# Patient Record
Sex: FEMALE | Race: WHITE
[De-identification: names, ages, dates, MRNs, and addresses within clinical notes are randomized per-mention and may not be internally consistent; named-entity substitution may affect disease eponyms.]

---

## 2017-10-25 ENCOUNTER — HOSPITAL ENCOUNTER (OUTPATIENT)
Dept: HOSPITAL 17 - PHED | Age: 62
Setting detail: OBSERVATION
LOS: 1 days | Discharge: HOME | End: 2017-10-26
Attending: HOSPITALIST | Admitting: HOSPITALIST
Payer: COMMERCIAL

## 2017-10-25 VITALS — DIASTOLIC BLOOD PRESSURE: 76 MMHG | SYSTOLIC BLOOD PRESSURE: 145 MMHG

## 2017-10-25 VITALS — HEIGHT: 63 IN | BODY MASS INDEX: 32.42 KG/M2 | WEIGHT: 182.98 LBS

## 2017-10-25 VITALS
OXYGEN SATURATION: 95 % | SYSTOLIC BLOOD PRESSURE: 179 MMHG | HEART RATE: 85 BPM | TEMPERATURE: 97.6 F | RESPIRATION RATE: 20 BRPM | DIASTOLIC BLOOD PRESSURE: 86 MMHG

## 2017-10-25 VITALS — DIASTOLIC BLOOD PRESSURE: 80 MMHG | SYSTOLIC BLOOD PRESSURE: 141 MMHG

## 2017-10-25 DIAGNOSIS — M79.7: ICD-10-CM

## 2017-10-25 DIAGNOSIS — I10: ICD-10-CM

## 2017-10-25 DIAGNOSIS — F32.9: ICD-10-CM

## 2017-10-25 DIAGNOSIS — R07.89: Primary | ICD-10-CM

## 2017-10-25 DIAGNOSIS — R06.02: ICD-10-CM

## 2017-10-25 DIAGNOSIS — A60.00: ICD-10-CM

## 2017-10-25 DIAGNOSIS — G43.909: ICD-10-CM

## 2017-10-25 DIAGNOSIS — K21.9: ICD-10-CM

## 2017-10-25 DIAGNOSIS — Z79.899: ICD-10-CM

## 2017-10-25 DIAGNOSIS — Z82.49: ICD-10-CM

## 2017-10-25 DIAGNOSIS — F41.9: ICD-10-CM

## 2017-10-25 LAB
ALBUMIN SERPL-MCNC: 3.8 GM/DL (ref 3.4–5)
ALP SERPL-CCNC: 91 U/L (ref 45–117)
ALT SERPL-CCNC: 30 U/L (ref 10–53)
AST SERPL-CCNC: 15 U/L (ref 15–37)
BASOPHILS # BLD AUTO: 0.1 TH/MM3 (ref 0–0.2)
BASOPHILS NFR BLD: 0.7 % (ref 0–2)
BILIRUB SERPL-MCNC: 0.3 MG/DL (ref 0.2–1)
BUN SERPL-MCNC: 20 MG/DL (ref 7–18)
CALCIUM SERPL-MCNC: 8.4 MG/DL (ref 8.5–10.1)
CHLORIDE SERPL-SCNC: 109 MEQ/L (ref 98–107)
CREAT SERPL-MCNC: 1.2 MG/DL (ref 0.5–1)
EOSINOPHIL # BLD: 0.1 TH/MM3 (ref 0–0.4)
EOSINOPHIL NFR BLD: 1.6 % (ref 0–4)
ERYTHROCYTE [DISTWIDTH] IN BLOOD BY AUTOMATED COUNT: 12.8 % (ref 11.6–17.2)
GFR SERPLBLD BASED ON 1.73 SQ M-ARVRAT: 46 ML/MIN (ref 89–?)
GLUCOSE SERPL-MCNC: 106 MG/DL (ref 74–106)
HCO3 BLD-SCNC: 25 MEQ/L (ref 21–32)
HCT VFR BLD CALC: 43 % (ref 35–46)
HGB BLD-MCNC: 14.1 GM/DL (ref 11.6–15.3)
LYMPHOCYTES # BLD AUTO: 2 TH/MM3 (ref 1–4.8)
LYMPHOCYTES NFR BLD AUTO: 26.8 % (ref 9–44)
MCH RBC QN AUTO: 30.4 PG (ref 27–34)
MCHC RBC AUTO-ENTMCNC: 32.7 % (ref 32–36)
MCV RBC AUTO: 93 FL (ref 80–100)
MONOCYTE #: 0.5 TH/MM3 (ref 0–0.9)
MONOCYTES NFR BLD: 6.6 % (ref 0–8)
NEUTROPHILS # BLD AUTO: 4.7 TH/MM3 (ref 1.8–7.7)
NEUTROPHILS NFR BLD AUTO: 64.3 % (ref 16–70)
PLATELET # BLD: 185 TH/MM3 (ref 150–450)
PMV BLD AUTO: 9.2 FL (ref 7–11)
PROT SERPL-MCNC: 7.4 GM/DL (ref 6.4–8.2)
RBC # BLD AUTO: 4.62 MIL/MM3 (ref 4–5.3)
SODIUM SERPL-SCNC: 141 MEQ/L (ref 136–145)
TROPONIN I SERPL-MCNC: (no result) NG/ML (ref 0.02–0.05)
WBC # BLD AUTO: 7.4 TH/MM3 (ref 4–11)

## 2017-10-25 PROCEDURE — G0378 HOSPITAL OBSERVATION PER HR: HCPCS

## 2017-10-25 PROCEDURE — 93005 ELECTROCARDIOGRAM TRACING: CPT

## 2017-10-25 PROCEDURE — A9502 TC99M TETROFOSMIN: HCPCS

## 2017-10-25 PROCEDURE — 85025 COMPLETE CBC W/AUTO DIFF WBC: CPT

## 2017-10-25 PROCEDURE — 80053 COMPREHEN METABOLIC PANEL: CPT

## 2017-10-25 PROCEDURE — 93017 CV STRESS TEST TRACING ONLY: CPT

## 2017-10-25 PROCEDURE — 96374 THER/PROPH/DIAG INJ IV PUSH: CPT

## 2017-10-25 PROCEDURE — 78452 HT MUSCLE IMAGE SPECT MULT: CPT

## 2017-10-25 PROCEDURE — 99285 EMERGENCY DEPT VISIT HI MDM: CPT

## 2017-10-25 PROCEDURE — 71010: CPT

## 2017-10-25 PROCEDURE — 84484 ASSAY OF TROPONIN QUANT: CPT

## 2017-10-25 NOTE — RADRPT
EXAM DATE/TIME:  10/25/2017 22:53 

 

[facility] COMPARISON:     

No previous studies available for comparison.

 

                     

INDICATIONS :     

Chest pain.

                     

 

MEDICAL HISTORY :     

None.          

 

SURGICAL HISTORY :     

None.   

 

ENCOUNTER:     

Initial                                        

 

ACUITY:     

1 day      

 

PAIN SCORE:     

6/10

 

LOCATION:      

middle chest

 

FINDINGS:     

A single view of the chest demonstrates the lungs to be symmetrically aerated without evidence of mas
s, infiltrate or effusion.  The cardiomediastinal contours are unremarkable.  Osseous structures are 
intact.

 

CONCLUSION:     No acute disease.  

 

 

 

 Frank Serrano MD on October 25, 2017 at 23:05           

Board Certified Radiologist.

 This report was verified electronically.

## 2017-10-25 NOTE — PD
HPI


Chief Complaint:  Chest Pain


Time Seen by Provider:  22:39


Travel History


International Travel<30 days:  No


Contact w/Intl Traveler<30days:  No


Traveled to known affect area:  No





History of Present Illness


HPI


This is a 62-year-old female who presents to the emergency department with 

chest pain that started about 2 hours prior to arrival, feeling like a pressure 

in the center of her chest, moderate severity associated with some tingling in 

her left arm and some discomfort in her jaw.  She also says she was short of 

breath when the chest pain was worse.  She had just taken a long walk prior to 

the onset of chest pain.  She denies any fevers or chills.  She denies any 

recent long trips or leg swelling.  She has a history of hypertension but 

denies diabetes, hyperlipidemia or smoking history.  Her brother had a heart 

attack in her father has congestive heart failure.  She thinks she may have had 

a stress test but it was years ago.





PFSH


Past Medical History


Hx Anticoagulant Therapy:  No


Anxiety:  Yes


Depression:  Yes


Cancer:  No


Cardiovascular Problems:  Yes (htn)


Chemotherapy:  No


Cerebrovascular Accident:  No


Diabetes:  No


Diminished Hearing:  No


Endocrine:  No


Fibromyalgia:  Yes


Gastrointestinal Disorders:  Yes (ACID REFLUX, DIVERTICULITIS, INFLAMED COLON)


GERD:  Yes


Genitourinary:  Yes (KIDNEY INFECTION, "STAGE 3 KIDNEY PROBLEM")


Hepatitis:  Yes (HEP C FROM BLOOD TRANSFUSION-AT 12)


Hiatal Hernia:  No


Hypertension:  Yes


Immune Disorder:  No


Musculoskeletal:  Yes (BACK PAIN)


Neurologic:  Yes (MIGRAINES, [id number])


Psychiatric:  No


Reproductive:  No


Respiratory:  No


Migraines:  Yes


Thyroid Disease:  No


Tetanus Vaccination:  > 5 Years


Pregnant?:  Not Pregnant


:  4


Para:  2


Miscarriage:  2


Tubal Ligation:  Yes





Past Surgical History


Abdominal Surgery:  Yes (appy)


AICD:  No


Appendectomy:  Yes


Gynecologic Surgery:  Yes (total hysterectomy)


Hysterectomy:  Yes (AGE 33)


Joint Replacement:  No


Pacemaker:  No


Other Surgery:  Yes





Social History


Alcohol Use:  Yes (social)


Tobacco Use:  No


Substance Use:  No





Allergies-Medications


(Allergen,Severity, Reaction):  


Coded Allergies:  


     penicillin G (Unverified  Allergy, Intermediate, HIVES, 8/15/17)


Reported Meds & Prescriptions





Reported Meds & Active Scripts


Active


Reported


Vitamin D3 (Cholecalciferol) 2,000 Unit Cap 2,000 Units PO DAILY


Imitrex (Sumatriptan Succinate) 100 Mg Tab 100 Mg PO ONCE PRN


     If a satisfactory response has not been obtained at 2 hours, a second


     dose may be administered


Acyclovir 400 Mg Tab 400 Mg PO BID


Topiramate 50 Mg Tab 50 Mg PO BID


Omeprazole 40 Mg Cap 40 Mg PO DAILY


Lisinopril 10 Mg Tab 10 Mg PO DAILY








Review of Systems


Except as stated in HPI:  all other systems reviewed are Neg





Physical Exam


Narrative


GENERAL:Well appearing, no acute distress


SKIN: Focused skin assessment warm and dry.


HEAD: Atraumatic. Normocephalic. 


EYES: Pupils equal and round.  No injection or drainage. 


ENT:  Moist mucous membranes


NECK: Trachea midline. 


CARDIOVASCULAR: Regular rate and rhythm.  No murmur appreciated.


RESPIRATORY: Clear to auscultation. Breath sounds equal bilaterally. 


GASTROINTESTINAL: Abdomen soft, non-tender, nondistended. 


MUSCULOSKELETAL: No obvious deformities. 


NEUROLOGICAL: Awake and alert. No obvious cranial nerve deficits.  Moving all 

extremities.


PSYCHIATRIC: Appropriate mood and affect; insight and judgment normal.





Data


Data


Last Documented VS





Vital Signs








  Date Time  Temp Pulse Resp B/P (MAP) Pulse Ox O2 Delivery O2 Flow Rate FiO2


 


10/25/17 22:40      Room Air  


 


10/25/17 22:20 97.6 85 20 179/86 (117) 95   








Orders





 Orders


Electrocardiogram (10/25/17 22:44)


Complete Blood Count With Diff (10/25/17 22:44)


Comprehensive Metabolic Panel (10/25/17 22:44)


Troponin I (10/25/17 22:44)


Chest, Single Ap (10/25/17 22:44)


Ecg Monitoring (10/25/17 22:44)


Bilateral Bp Monitoring (10/25/17 22:44)


Iv Access Insert/Monitor (10/25/17 22:44)


Oximetry (10/25/17 22:44)


Oxygen Administration (10/25/17 22:44)


Aspirin Chew (Aspirin Chew) (10/25/17 22:45)


Sodium Chloride 0.9% Flush (Ns Flush) (10/25/17 22:45)


Nitroglycerin 2% Oint (Nitroglycerin 2% (10/25/17 22:45)





Labs





Laboratory Tests








Test


  10/25/17


22:50


 


White Blood Count 7.4 TH/MM3 


 


Red Blood Count 4.62 MIL/MM3 


 


Hemoglobin 14.1 GM/DL 


 


Hematocrit 43.0 % 


 


Mean Corpuscular Volume 93.0 FL 


 


Mean Corpuscular Hemoglobin 30.4 PG 


 


Mean Corpuscular Hemoglobin


Concent 32.7 % 


 


 


Red Cell Distribution Width 12.8 % 


 


Platelet Count 185 TH/MM3 


 


Mean Platelet Volume 9.2 FL 


 


Neutrophils (%) (Auto) 64.3 % 


 


Lymphocytes (%) (Auto) 26.8 % 


 


Monocytes (%) (Auto) 6.6 % 


 


Eosinophils (%) (Auto) 1.6 % 


 


Basophils (%) (Auto) 0.7 % 


 


Neutrophils # (Auto) 4.7 TH/MM3 


 


Lymphocytes # (Auto) 2.0 TH/MM3 


 


Monocytes # (Auto) 0.5 TH/MM3 


 


Eosinophils # (Auto) 0.1 TH/MM3 


 


Basophils # (Auto) 0.1 TH/MM3 


 


CBC Comment DIFF FINAL 


 


Differential Comment  


 


Blood Urea Nitrogen 20 MG/DL 


 


Creatinine 1.20 MG/DL 


 


Random Glucose 106 MG/DL 


 


Total Protein 7.4 GM/DL 


 


Albumin 3.8 GM/DL 


 


Calcium Level 8.4 MG/DL 


 


Alkaline Phosphatase 91 U/L 


 


Aspartate Amino Transf


(AST/SGOT) 15 U/L 


 


 


Alanine Aminotransferase


(ALT/SGPT) 30 U/L 


 


 


Total Bilirubin 0.3 MG/DL 


 


Sodium Level 141 MEQ/L 


 


Potassium Level 3.7 MEQ/L 


 


Chloride Level 109 MEQ/L 


 


Carbon Dioxide Level 25.0 MEQ/L 


 


Anion Gap 7 MEQ/L 


 


Estimat Glomerular Filtration


Rate 46 ML/MIN 


 


 


Troponin I


  LESS THAN 0.02


NG/ML











MDM


Medical Decision Making


Medical Screen Exam Complete:  Yes


Emergency Medical Condition:  Yes


Interpretation(s)


EKG: Normal sinus rhythm with no ST changes


No leukocytosis


Mild renal insufficiency


Troponin is normal


Chest x-ray: No acute process


Differential Diagnosis


Acute coronary syndrome, pulmonary embolism, costochondritis, pericarditis


Narrative Course


This is a 62-year-old female who presents to the emergency department with left-

sided chest discomfort.  She is placed on a monitor and an IV was established.  

EKG is nonischemic.  Labs are unremarkable.  She was given aspirin and 

nitroglycerin.  Her blood pressure improved in the emergency department.  She'

ll be placed in observation for serial cardiac enzymes to rule out acute 

coronary syndrome.





Diagnosis





 Primary Impression:  


 Chest pain


 Qualified Codes:  R07.9 - Chest pain, unspecified





Admitting Information


Admitting Physician Requests:  Jennifer Zacarias MD Oct 25, 2017 22:47

## 2017-10-25 NOTE — PD
HPI


Chief Complaint:  Chest Pain


Time Seen by Provider:  22:39


Travel History


International Travel<30 days:  No


Contact w/Intl Traveler<30days:  No


Traveled to known affect area:  No





History of Present Illness


HPI


This is a 62-year-old female who presents to the emergency department with 

chest pain that started about 2 hours prior to arrival, feeling like a pressure 

in the center of her chest, moderate severity associated with some tingling in 

her left arm and some discomfort in her jaw.  She also says she was short of 

breath when the chest pain was worse.  She had just taken a long walk prior to 

the onset of chest pain.  She denies any fevers or chills.  She denies any 

recent long trips or leg swelling.  She has a history of hypertension but 

denies diabetes, hyperlipidemia or smoking history.  Her brother had a heart 

attack in her father has congestive heart failure.  She thinks she may have had 

a stress test but it was years ago.





PFSH


Past Medical History


Hx Anticoagulant Therapy:  No


Anxiety:  Yes


Depression:  Yes


Cancer:  No


Cardiovascular Problems:  Yes (htn)


Chemotherapy:  No


Cerebrovascular Accident:  No


Diabetes:  No


Diminished Hearing:  No


Endocrine:  No


Fibromyalgia:  Yes


Gastrointestinal Disorders:  Yes (ACID REFLUX, DIVERTICULITIS, INFLAMED COLON)


GERD:  Yes


Genitourinary:  Yes (KIDNEY INFECTION, "STAGE 3 KIDNEY PROBLEM")


Hepatitis:  Yes (HEP C FROM BLOOD TRANSFUSION-AT 12)


Hiatal Hernia:  No


Hypertension:  Yes


Immune Disorder:  No


Musculoskeletal:  Yes (BACK PAIN)


Neurologic:  Yes (MIGRAINES, [id number])


Psychiatric:  No


Reproductive:  No


Respiratory:  No


Migraines:  Yes


Thyroid Disease:  No


Tetanus Vaccination:  > 5 Years


Pregnant?:  Not Pregnant


:  4


Para:  2


Miscarriage:  2


Tubal Ligation:  Yes





Past Surgical History


Abdominal Surgery:  Yes (appy)


AICD:  No


Appendectomy:  Yes


Gynecologic Surgery:  Yes (total hysterectomy)


Hysterectomy:  Yes (AGE 33)


Joint Replacement:  No


Pacemaker:  No


Other Surgery:  Yes





Social History


Alcohol Use:  Yes (social)


Tobacco Use:  No


Substance Use:  No





Allergies-Medications


(Allergen,Severity, Reaction):  


Coded Allergies:  


     penicillin G (Unverified  Allergy, Intermediate, HIVES, 8/15/17)


Reported Meds & Prescriptions





Reported Meds & Active Scripts


Active


Reported


Vitamin D3 (Cholecalciferol) 2,000 Unit Cap 2,000 Units PO DAILY


Imitrex (Sumatriptan Succinate) 100 Mg Tab 100 Mg PO ONCE PRN


     If a satisfactory response has not been obtained at 2 hours, a second


     dose may be administered


Acyclovir 400 Mg Tab 400 Mg PO BID


Topiramate 50 Mg Tab 50 Mg PO BID


Omeprazole 40 Mg Cap 40 Mg PO DAILY


Lisinopril 10 Mg Tab 10 Mg PO DAILY








Review of Systems


Except as stated in HPI:  all other systems reviewed are Neg





Physical Exam


Narrative


GENERAL:Well appearing, no acute distress


SKIN: Focused skin assessment warm and dry.


HEAD: Atraumatic. Normocephalic. 


EYES: Pupils equal and round.  No injection or drainage. 


ENT:  Moist mucous membranes


NECK: Trachea midline. 


CARDIOVASCULAR: Regular rate and rhythm.  No murmur appreciated.


RESPIRATORY: Clear to auscultation. Breath sounds equal bilaterally. 


GASTROINTESTINAL: Abdomen soft, non-tender, nondistended. 


MUSCULOSKELETAL: No obvious deformities. 


NEUROLOGICAL: Awake and alert. No obvious cranial nerve deficits.  Moving all 

extremities.


PSYCHIATRIC: Appropriate mood and affect; insight and judgment normal.





Data


Data


Last Documented VS





Vital Signs








  Date Time  Temp Pulse Resp B/P (MAP) Pulse Ox O2 Delivery O2 Flow Rate FiO2


 


10/25/17 22:40      Room Air  


 


10/25/17 22:20 97.6 85 20 179/86 (117) 95   








Orders





 Orders


Electrocardiogram (10/25/17 22:44)


Complete Blood Count With Diff (10/25/17 22:44)


Comprehensive Metabolic Panel (10/25/17 22:44)


Troponin I (10/25/17 22:44)


Chest, Single Ap (10/25/17 22:44)


Ecg Monitoring (10/25/17 22:44)


Bilateral Bp Monitoring (10/25/17 22:44)


Iv Access Insert/Monitor (10/25/17 22:44)


Oximetry (10/25/17 22:44)


Oxygen Administration (10/25/17 22:44)


Aspirin Chew (Aspirin Chew) (10/25/17 22:45)


Sodium Chloride 0.9% Flush (Ns Flush) (10/25/17 22:45)


Nitroglycerin 2% Oint (Nitroglycerin 2% (10/25/17 22:45)





Labs





Laboratory Tests








Test


  10/25/17


22:50


 


White Blood Count 7.4 TH/MM3 


 


Red Blood Count 4.62 MIL/MM3 


 


Hemoglobin 14.1 GM/DL 


 


Hematocrit 43.0 % 


 


Mean Corpuscular Volume 93.0 FL 


 


Mean Corpuscular Hemoglobin 30.4 PG 


 


Mean Corpuscular Hemoglobin


Concent 32.7 % 


 


 


Red Cell Distribution Width 12.8 % 


 


Platelet Count 185 TH/MM3 


 


Mean Platelet Volume 9.2 FL 


 


Neutrophils (%) (Auto) 64.3 % 


 


Lymphocytes (%) (Auto) 26.8 % 


 


Monocytes (%) (Auto) 6.6 % 


 


Eosinophils (%) (Auto) 1.6 % 


 


Basophils (%) (Auto) 0.7 % 


 


Neutrophils # (Auto) 4.7 TH/MM3 


 


Lymphocytes # (Auto) 2.0 TH/MM3 


 


Monocytes # (Auto) 0.5 TH/MM3 


 


Eosinophils # (Auto) 0.1 TH/MM3 


 


Basophils # (Auto) 0.1 TH/MM3 


 


CBC Comment DIFF FINAL 


 


Differential Comment  


 


Blood Urea Nitrogen 20 MG/DL 


 


Creatinine 1.20 MG/DL 


 


Random Glucose 106 MG/DL 


 


Total Protein 7.4 GM/DL 


 


Albumin 3.8 GM/DL 


 


Calcium Level 8.4 MG/DL 


 


Alkaline Phosphatase 91 U/L 


 


Aspartate Amino Transf


(AST/SGOT) 15 U/L 


 


 


Alanine Aminotransferase


(ALT/SGPT) 30 U/L 


 


 


Total Bilirubin 0.3 MG/DL 


 


Sodium Level 141 MEQ/L 


 


Potassium Level 3.7 MEQ/L 


 


Chloride Level 109 MEQ/L 


 


Carbon Dioxide Level 25.0 MEQ/L 


 


Anion Gap 7 MEQ/L 


 


Estimat Glomerular Filtration


Rate 46 ML/MIN 


 


 


Troponin I


  LESS THAN 0.02


NG/ML











MDM


Medical Decision Making


Medical Screen Exam Complete:  Yes


Emergency Medical Condition:  Yes


Interpretation(s)


EKG: Normal sinus rhythm with no ST changes


No leukocytosis


Mild renal insufficiency


Troponin is normal


Chest x-ray: No acute process


Differential Diagnosis


Acute coronary syndrome, pulmonary embolism, costochondritis, pericarditis


Narrative Course


This is a 62-year-old female who presents to the emergency department with left-

sided chest discomfort.  She is placed on a monitor and an IV was established.  

EKG is nonischemic.  Labs are unremarkable.  She was given aspirin and 

nitroglycerin.  Her blood pressure improved in the emergency department.  She'

ll be placed in observation for serial cardiac enzymes to rule out acute 

coronary syndrome.





Diagnosis





 Primary Impression:  


 Chest pain


 Qualified Codes:  R07.9 - Chest pain, unspecified





Admitting Information


Admitting Physician Requests:  Jennifer Zacarias MD Oct 25, 2017 22:47

## 2017-10-25 NOTE — PD
HPI


Chief Complaint:  Chest Pain


Time Seen by Provider:  22:39


Travel History


International Travel<30 days:  No


Contact w/Intl Traveler<30days:  No


Traveled to known affect area:  No





History of Present Illness


HPI


This is a 62-year-old female who presents to the emergency department with 

chest pain that started about 2 hours prior to arrival, feeling like a pressure 

in the center of her chest, moderate severity associated with some tingling in 

her left arm and some discomfort in her jaw.  She also says she was short of 

breath when the chest pain was worse.  She had just taken a long walk prior to 

the onset of chest pain.  She denies any fevers or chills.  She denies any 

recent long trips or leg swelling.  She has a history of hypertension but 

denies diabetes, hyperlipidemia or smoking history.  Her brother had a heart 

attack in her father has congestive heart failure.  She thinks she may have had 

a stress test but it was years ago.





PFSH


Past Medical History


Hx Anticoagulant Therapy:  No


Anxiety:  Yes


Depression:  Yes


Cancer:  No


Cardiovascular Problems:  Yes (htn)


Chemotherapy:  No


Cerebrovascular Accident:  No


Diabetes:  No


Diminished Hearing:  No


Endocrine:  No


Fibromyalgia:  Yes


Gastrointestinal Disorders:  Yes (ACID REFLUX, DIVERTICULITIS, INFLAMED COLON)


GERD:  Yes


Genitourinary:  Yes (KIDNEY INFECTION, "STAGE 3 KIDNEY PROBLEM")


Hepatitis:  Yes (HEP C FROM BLOOD TRANSFUSION-AT 12)


Hiatal Hernia:  No


Hypertension:  Yes


Immune Disorder:  No


Musculoskeletal:  Yes (BACK PAIN)


Neurologic:  Yes (MIGRAINES, [id number])


Psychiatric:  No


Reproductive:  No


Respiratory:  No


Migraines:  Yes


Thyroid Disease:  No


Tetanus Vaccination:  > 5 Years


Pregnant?:  Not Pregnant


:  4


Para:  2


Miscarriage:  2


Tubal Ligation:  Yes





Past Surgical History


Abdominal Surgery:  Yes (appy)


AICD:  No


Appendectomy:  Yes


Gynecologic Surgery:  Yes (total hysterectomy)


Hysterectomy:  Yes (AGE 33)


Joint Replacement:  No


Pacemaker:  No


Other Surgery:  Yes





Social History


Alcohol Use:  Yes (social)


Tobacco Use:  No


Substance Use:  No





Allergies-Medications


(Allergen,Severity, Reaction):  


Coded Allergies:  


     penicillin G (Unverified  Allergy, Intermediate, HIVES, 8/15/17)


Reported Meds & Prescriptions





Reported Meds & Active Scripts


Active


Reported


Vitamin D3 (Cholecalciferol) 2,000 Unit Cap 2,000 Units PO DAILY


Imitrex (Sumatriptan Succinate) 100 Mg Tab 100 Mg PO ONCE PRN


     If a satisfactory response has not been obtained at 2 hours, a second


     dose may be administered


Acyclovir 400 Mg Tab 400 Mg PO BID


Topiramate 50 Mg Tab 50 Mg PO BID


Omeprazole 40 Mg Cap 40 Mg PO DAILY


Lisinopril 10 Mg Tab 10 Mg PO DAILY








Review of Systems


Except as stated in HPI:  all other systems reviewed are Neg





Physical Exam


Narrative


GENERAL:Well appearing, no acute distress


SKIN: Focused skin assessment warm and dry.


HEAD: Atraumatic. Normocephalic. 


EYES: Pupils equal and round.  No injection or drainage. 


ENT:  Moist mucous membranes


NECK: Trachea midline. 


CARDIOVASCULAR: Regular rate and rhythm.  No murmur appreciated.


RESPIRATORY: Clear to auscultation. Breath sounds equal bilaterally. 


GASTROINTESTINAL: Abdomen soft, non-tender, nondistended. 


MUSCULOSKELETAL: No obvious deformities. 


NEUROLOGICAL: Awake and alert. No obvious cranial nerve deficits.  Moving all 

extremities.


PSYCHIATRIC: Appropriate mood and affect; insight and judgment normal.





Data


Data


Last Documented VS





Vital Signs








  Date Time  Temp Pulse Resp B/P (MAP) Pulse Ox O2 Delivery O2 Flow Rate FiO2


 


10/25/17 22:40      Room Air  


 


10/25/17 22:20 97.6 85 20 179/86 (117) 95   








Orders





 Orders


Electrocardiogram (10/25/17 22:44)


Complete Blood Count With Diff (10/25/17 22:44)


Comprehensive Metabolic Panel (10/25/17 22:44)


Troponin I (10/25/17 22:44)


Chest, Single Ap (10/25/17 22:44)


Ecg Monitoring (10/25/17 22:44)


Bilateral Bp Monitoring (10/25/17 22:44)


Iv Access Insert/Monitor (10/25/17 22:44)


Oximetry (10/25/17 22:44)


Oxygen Administration (10/25/17 22:44)


Aspirin Chew (Aspirin Chew) (10/25/17 22:45)


Sodium Chloride 0.9% Flush (Ns Flush) (10/25/17 22:45)


Nitroglycerin 2% Oint (Nitroglycerin 2% (10/25/17 22:45)





Labs





Laboratory Tests








Test


  10/25/17


22:50


 


White Blood Count 7.4 TH/MM3 


 


Red Blood Count 4.62 MIL/MM3 


 


Hemoglobin 14.1 GM/DL 


 


Hematocrit 43.0 % 


 


Mean Corpuscular Volume 93.0 FL 


 


Mean Corpuscular Hemoglobin 30.4 PG 


 


Mean Corpuscular Hemoglobin


Concent 32.7 % 


 


 


Red Cell Distribution Width 12.8 % 


 


Platelet Count 185 TH/MM3 


 


Mean Platelet Volume 9.2 FL 


 


Neutrophils (%) (Auto) 64.3 % 


 


Lymphocytes (%) (Auto) 26.8 % 


 


Monocytes (%) (Auto) 6.6 % 


 


Eosinophils (%) (Auto) 1.6 % 


 


Basophils (%) (Auto) 0.7 % 


 


Neutrophils # (Auto) 4.7 TH/MM3 


 


Lymphocytes # (Auto) 2.0 TH/MM3 


 


Monocytes # (Auto) 0.5 TH/MM3 


 


Eosinophils # (Auto) 0.1 TH/MM3 


 


Basophils # (Auto) 0.1 TH/MM3 


 


CBC Comment DIFF FINAL 


 


Differential Comment  


 


Blood Urea Nitrogen 20 MG/DL 


 


Creatinine 1.20 MG/DL 


 


Random Glucose 106 MG/DL 


 


Total Protein 7.4 GM/DL 


 


Albumin 3.8 GM/DL 


 


Calcium Level 8.4 MG/DL 


 


Alkaline Phosphatase 91 U/L 


 


Aspartate Amino Transf


(AST/SGOT) 15 U/L 


 


 


Alanine Aminotransferase


(ALT/SGPT) 30 U/L 


 


 


Total Bilirubin 0.3 MG/DL 


 


Sodium Level 141 MEQ/L 


 


Potassium Level 3.7 MEQ/L 


 


Chloride Level 109 MEQ/L 


 


Carbon Dioxide Level 25.0 MEQ/L 


 


Anion Gap 7 MEQ/L 


 


Estimat Glomerular Filtration


Rate 46 ML/MIN 


 


 


Troponin I


  LESS THAN 0.02


NG/ML











MDM


Medical Decision Making


Medical Screen Exam Complete:  Yes


Emergency Medical Condition:  Yes


Interpretation(s)


EKG: Normal sinus rhythm with no ST changes


No leukocytosis


Mild renal insufficiency


Troponin is normal


Chest x-ray: No acute process


Differential Diagnosis


Acute coronary syndrome, pulmonary embolism, costochondritis, pericarditis


Narrative Course


This is a 62-year-old female who presents to the emergency department with left-

sided chest discomfort.  She is placed on a monitor and an IV was established.  

EKG is nonischemic.  Labs are unremarkable.  She was given aspirin and 

nitroglycerin.  Her blood pressure improved in the emergency department.  She'

ll be placed in observation for serial cardiac enzymes to rule out acute 

coronary syndrome.





Diagnosis





 Primary Impression:  


 Chest pain


 Qualified Codes:  R07.9 - Chest pain, unspecified





Admitting Information


Admitting Physician Requests:  Jennifer Zacarias MD Oct 25, 2017 22:47

## 2017-10-26 VITALS
DIASTOLIC BLOOD PRESSURE: 62 MMHG | OXYGEN SATURATION: 96 % | HEART RATE: 73 BPM | TEMPERATURE: 96.1 F | RESPIRATION RATE: 20 BRPM | SYSTOLIC BLOOD PRESSURE: 106 MMHG

## 2017-10-26 VITALS
RESPIRATION RATE: 20 BRPM | SYSTOLIC BLOOD PRESSURE: 122 MMHG | DIASTOLIC BLOOD PRESSURE: 63 MMHG | TEMPERATURE: 96.4 F | HEART RATE: 72 BPM | OXYGEN SATURATION: 93 %

## 2017-10-26 VITALS — OXYGEN SATURATION: 96 %

## 2017-10-26 VITALS — SYSTOLIC BLOOD PRESSURE: 109 MMHG | DIASTOLIC BLOOD PRESSURE: 66 MMHG

## 2017-10-26 VITALS
RESPIRATION RATE: 20 BRPM | DIASTOLIC BLOOD PRESSURE: 63 MMHG | OXYGEN SATURATION: 96 % | SYSTOLIC BLOOD PRESSURE: 113 MMHG | HEART RATE: 82 BPM

## 2017-10-26 VITALS
RESPIRATION RATE: 20 BRPM | DIASTOLIC BLOOD PRESSURE: 70 MMHG | HEART RATE: 80 BPM | TEMPERATURE: 98.4 F | OXYGEN SATURATION: 94 % | SYSTOLIC BLOOD PRESSURE: 127 MMHG

## 2017-10-26 VITALS
DIASTOLIC BLOOD PRESSURE: 58 MMHG | OXYGEN SATURATION: 95 % | SYSTOLIC BLOOD PRESSURE: 103 MMHG | HEART RATE: 78 BPM | RESPIRATION RATE: 16 BRPM

## 2017-10-26 RX ADMIN — Medication SCH ML: at 01:55

## 2017-10-26 RX ADMIN — Medication SCH ML: at 09:00

## 2017-10-26 NOTE — EKG
Date Performed: 10/26/2017       Time Performed: 04:26:25

 

PTAGE:      62 years

 

EKG:      Sinus rhythm 

 

 NORMAL ECG

 

PREVIOUS TRACING       : 10/26/2017 01.56 Compared to prior tracing no significant change

 

DOCTOR:   Aziza Kingston  Interpretating Date/Time  10/26/2017 14:54:12

## 2017-10-26 NOTE — EKG
Date Performed: 10/26/2017       Time Performed: 01:56:03

 

PTAGE:      62 years

 

EKG:      Sinus rhythm 

 

 WITH OCCASIONAL SUPRAVENTRICULAR PREMATURE COMPLEXES BORDERLINE ECG 

 

NO PREVIOUS TRACING             Compared to prior tracing no significant change

 

DOCTOR:   Aziza Kingston  Interpretating Date/Time  10/26/2017 14:54:05

## 2017-10-26 NOTE — EKG
Date Performed: 10/25/2017       Time Performed: 22:24:12

 

PTAGE:      62 years

 

EKG:      Sinus rhythm 

 

 NORMAL ECG 

 

NO PREVIOUS TRACING            

 

DOCTOR:   Aziza Kingston  Interpretating Date/Time  10/26/2017 14:45:52

## 2017-10-26 NOTE — HHI.DCPOC
Discharge Care Plan


Diagnosis:  


(1) Chest pain








Your Health Problems Are: Chest Pain








Goals to Promote Your Health


* To prevent worsening of your condition and complications


* To maintain your health at the optimal level


Directions to Meet Your Goals


*** Take your medications as prescribed


*** Follow your dietary instruction


*** Follow activity as directed








*** Keep your appointments as scheduled


*** Take your immunizations and boosters as scheduled


*** If your symptoms worsen call your PCP, if no PCP go to Urgent Care Center 

or Emergency Room***


*** Smoking is Dangerous to Your Health. Avoid second hand smoke***


***Call the 24-hour hour crisis hotline for domestic abuse at 1-902.572.2054***











Joao Whitman Oct 26, 2017 12:54

## 2017-10-26 NOTE — HHI.HP
__________________________________________________





[facility]


Service


[facility]


Primary Care Physician


GUICHO Han Do, MD


Admission Diagnosis





chest pain


Diagnoses:  


Chief Complaint:  


Chest pain


Travel History


International Travel<30 Days:  No


Contact w/Intl Traveler <30 Da:  No


Traveled to Known Affected Are:  No


History of Present Illness


62-year-old white female being admitted for chest pain.  Patient was in her 

usual state of health until last night when she was sitting watching television 

and experienced a sudden onset of midsternal chest pain that was crushing in 

nature, up to an 8 out of 10, with some mild radiation to her jaw and left 

shoulder.  Had some shortness of breath associated with this, denies any nausea 

vomiting or any diaphoresis otherwise.  Denies taking any medications try to 

treat the pain.  She held a pillow tightly to her chest to see this would help, 

nothing really helped or worsened her pain.  Pain remained at a constant level 

and at that point patient decided to proceed to the ER.





Patient does admit to taking daily acid reflux medications.





Review of Systems


Except as stated in HPI:  all other systems reviewed are Neg





Past Family Social History


Past Medical History


fibromyalgia


HTN


acid reflux


Genital herpes


Migraines


Past Surgical History


rotator cuff repair


carpal tunnel surgery


appendectomy


Allergies:  


Coded Allergies:  


     penicillin G (Unverified  Allergy, Intermediate, HIVES, 8/15/17)


Family History


CAD


Social History


used to work in retail


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


denies cig use, lived w/ parents that smoked as a child





Physical Exam


Vital Signs





Vital Signs








  Date Time  Temp Pulse Resp B/P (MAP) Pulse Ox O2 Delivery O2 Flow Rate FiO2


 


10/26/17 08:00 96.4 72 20 122/63 (82) 93   


 


10/26/17 03:00  73      


 


10/26/17 03:00 96.1 72 20 106/62 (77) 96   


 


10/26/17 02:38  69 16 109/66 (80) 96  2.00 


 


10/26/17 01:40     96 Nasal Cannula 2.00 


 


10/26/17 01:15  78 16 103/58 (73) 95 Nasal Cannula 2.00 


 


10/26/17 00:31  82 20 113/63 (80) 96 Nasal Cannula 2.00 


 


10/25/17 23:05    141/80 (100)    


 


10/25/17 23:00    145/76 (99)    


 


10/25/17 22:40      Room Air  


 


10/25/17 22:35      Nasal Cannula 2.00 


 


10/25/17 22:20     95 Nasal Cannula 2.00 


 


10/25/17 22:20 97.6 85 20 179/86 (117) 95   








Physical Exam


VS: Afebrile


GENERAL: No acute distress, awake, lying in bed


SKIN: Warm and dry.


EYES:  No scleral icterus. No injection or drainage. 


ENT: No nasal bleeding or discharge.  Mucous membranes pink and moist.


CARDIOVASCULAR: Regular rate and rhythm.  no murmurs


RESPIRATORY: No accessory muscle use. Clear to auscultation. Breath sounds 

equal bilaterally. 


GASTROINTESTINAL: Abdomen soft, non-tender, nondistended.  


Extremities: No clubbing, cyanosis, or edema. No obvious deformities. 


MUSCULOSKELETAL: Extremities without clubbing, cyanosis, or edema. No obvious 

deformities. grossly intact ROM with 5/5 strength in upper and lower 

extremities proximally.  Mild chest wall sternal soreness which the patient 

says is not the same as her pain upon presentation.


NEUROLOGICAL: Awake and alert. No obvious cranial nerve deficits.  No facial 

droop nor slurred speech noted.


PSYCHIATRIC: Appropriate mood and affect; insight and judgment normal.


Laboratory





Laboratory Tests








Test


  10/25/17


22:50 10/26/17


01:37 10/26/17


05:15


 


White Blood Count 7.4   


 


Red Blood Count 4.62   


 


Hemoglobin 14.1   


 


Hematocrit 43.0   


 


Mean Corpuscular Volume 93.0   


 


Mean Corpuscular Hemoglobin 30.4   


 


Mean Corpuscular Hemoglobin


Concent 32.7 


  


  


 


 


Red Cell Distribution Width 12.8   


 


Platelet Count 185   


 


Mean Platelet Volume 9.2   


 


Neutrophils (%) (Auto) 64.3   


 


Lymphocytes (%) (Auto) 26.8   


 


Monocytes (%) (Auto) 6.6   


 


Eosinophils (%) (Auto) 1.6   


 


Basophils (%) (Auto) 0.7   


 


Neutrophils # (Auto) 4.7   


 


Lymphocytes # (Auto) 2.0   


 


Monocytes # (Auto) 0.5   


 


Eosinophils # (Auto) 0.1   


 


Basophils # (Auto) 0.1   


 


CBC Comment DIFF FINAL   


 


Differential Comment    


 


Blood Urea Nitrogen 20   


 


Creatinine 1.20   


 


Random Glucose 106   


 


Total Protein 7.4   


 


Albumin 3.8   


 


Calcium Level 8.4   


 


Alkaline Phosphatase 91   


 


Aspartate Amino Transf


(AST/SGOT) 15 


  


  


 


 


Alanine Aminotransferase


(ALT/SGPT) 30 


  


  


 


 


Total Bilirubin 0.3   


 


Sodium Level 141   


 


Potassium Level 3.7   


 


Chloride Level 109   


 


Carbon Dioxide Level 25.0   


 


Anion Gap 7   


 


Estimat Glomerular Filtration


Rate 46 


  


  


 


 


Troponin I LESS THAN 0.02  LESS THAN 0.02  LESS THAN 0.02 








Result Diagram:  


10/25/17 2250                                                                  

              10/25/17 2250





Imaging





Last Impressions








Myocardial Perfusion Scan Nuc Med 10/26/17 0000 Signed





Impressions: 





 Service Date/Time:   10:28 - CONCLUSION:  1. No 

fixed 





 or reversible defects to suggest ischemia or infarction. 2. Normal wall motion 





 and calculated ejection fraction.  RISK CATEGORY:     Low (<1%% Annual 

Mortality 





 Rate)      Harvey Brothers MD 


 


Chest X-Ray 10/25/17 2244 Signed





Impressions: 





 Service Date/Time:  2017 22:53 - CONCLUSION: No acute 





 disease.       Frank Serrano MD 











Caprini VTE Risk Assessment


Caprini VTE Risk Assessment:  Mod/High Risk (score >= 2)


Caprini Risk Assessment Model











 Point Value = 1          Point Value = 2  Point Value = 3  Point Value = 5


 


Age 41-60


Minor surgery


BMI > 25 kg/m2


Swollen legs


Varicose veins


Pregnancy or postpartum


History of unexplained or recurrent


   spontaneous 


Oral contraceptives or hormone


   replacement


Sepsis (< 1 month)


Serious lung disease, including


   pneumonia (< 1 month)


Abnormal pulmonary function


Acute myocardial infarction


Congestive heart failure (< 1 month)


History of inflammatory bowel disease


Medical patient at bed rest Age 61-74


Arthroscopic surgery


Major open surgery (> 45 min)


Laparoscopic surgery (> 45 min)


Malignancy


Confined to bed (> 72 hours)


Immobilizing plaster cast


Central venous access Age >= 75


History of VTE


Family history of VTE


Factor V Leiden


Prothrombin 44755D


Lupus anticoagulant


Anticardiolipin antibodies


Elevated serum homocysteine


Heparin-induced thrombocytopenia


Other congenital or acquired


   thrombophilia Stroke (< 1 month)


Elective arthroplasty


Hip, pelvis, or leg fracture


Acute spinal cord injury (< 1 month)








Prophylaxis Regimen











   Total Risk


Factor Score Risk Level Prophylaxis Regimen


 


0-1      Low Early ambulation


 


2 Moderate Order ONE of the following:


*Sequential Compression Device (SCD)


*Heparin 5000 units SQ BID


 


3-4 Higher Order ONE of the following medications:


*Heparin 5000 units SQ TID


*Enoxaparin/Lovenox 40 mg SQ daily (WT < 150 kg, CrCl > 30 mL/min)


*Enoxaparin/Lovenox 30 mg SQ daily (WT < 150 kg, CrCl > 10-29 mL/min)


*Enoxaparin/Lovenox 30 mg SQ BID (WT < 150 kg, CrCl > 30 mL/min)


AND/OR


*Sequential Compression Device (SCD)


 


5 or more Highest Order ONE of the following medications:


*Heparin 5000 units SQ TID (Preferred with Epidurals)


*Enoxaparin/Lovenox 40 mg SQ daily (WT < 150 kg, CrCl > 30 mL/min)


*Enoxaparin/Lovenox 30 mg SQ daily (WT < 150 kg, CrCl > 10-29 mL/min)


*Enoxaparin/Lovenox 30 mg SQ BID (WT < 150 kg, CrCl > 30 mL/min)


AND


*Sequential Compression Device (SCD)











Assessment and Plan


Assessment and Plan


62-year-old white female admitted for chest pain.  Hemodynamically stable upon 

admission. 





Chest pain


- I independently reviewed the EKGs show sinus rhythm.  We'll proceed with 

Lexiscan protocol initial cardiac workup otherwise, trending troponins.  Unable 

to do treadmill stress test due to fibromyalgia.  Explained to patient that 

most common cause chest pain is GERD and muscle skeletal origin otherwise.





Hypertension


- Continue home lisinopril





GERD


- Continue PPI





Migraine


- Patient complaining of migraine at this moment actively, will give one-time 

dose of Reglan and hold off on her when necessary sumatriptan since she might 

have possible stable angina.  Continue her home topiramate





Genital herpes


- Continue home acyclovir











Discharge disposition


- Lexiscan was negative, patient's clinical status was stable.  Has met maximal 

benefit from hospitalization and is clinically stable for discharge.











Mauricio Cui MD Oct 26, 2017 09:30

## 2017-10-26 NOTE — HHI.HP
__________________________________________________





[facility]


Service


[facility]


Primary Care Physician


GUICHO Han Do, MD


Admission Diagnosis





chest pain


Diagnoses:  


Chief Complaint:  


Chest pain


Travel History


International Travel<30 Days:  No


Contact w/Intl Traveler <30 Da:  No


Traveled to Known Affected Are:  No


History of Present Illness


62-year-old white female being admitted for chest pain.  Patient was in her 

usual state of health until last night when she was sitting watching television 

and experienced a sudden onset of midsternal chest pain that was crushing in 

nature, up to an 8 out of 10, with some mild radiation to her jaw and left 

shoulder.  Had some shortness of breath associated with this, denies any nausea 

vomiting or any diaphoresis otherwise.  Denies taking any medications try to 

treat the pain.  She held a pillow tightly to her chest to see this would help, 

nothing really helped or worsened her pain.  Pain remained at a constant level 

and at that point patient decided to proceed to the ER.





Patient does admit to taking daily acid reflux medications.





Review of Systems


Except as stated in HPI:  all other systems reviewed are Neg





Past Family Social History


Past Medical History


fibromyalgia


HTN


acid reflux


Genital herpes


Migraines


Past Surgical History


rotator cuff repair


carpal tunnel surgery


appendectomy


Allergies:  


Coded Allergies:  


     penicillin G (Unverified  Allergy, Intermediate, HIVES, 8/15/17)


Family History


CAD


Social History


used to work in retail


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


denies cig use, lived w/ parents that smoked as a child





Physical Exam


Vital Signs





Vital Signs








  Date Time  Temp Pulse Resp B/P (MAP) Pulse Ox O2 Delivery O2 Flow Rate FiO2


 


10/26/17 08:00 96.4 72 20 122/63 (82) 93   


 


10/26/17 03:00  73      


 


10/26/17 03:00 96.1 72 20 106/62 (77) 96   


 


10/26/17 02:38  69 16 109/66 (80) 96  2.00 


 


10/26/17 01:40     96 Nasal Cannula 2.00 


 


10/26/17 01:15  78 16 103/58 (73) 95 Nasal Cannula 2.00 


 


10/26/17 00:31  82 20 113/63 (80) 96 Nasal Cannula 2.00 


 


10/25/17 23:05    141/80 (100)    


 


10/25/17 23:00    145/76 (99)    


 


10/25/17 22:40      Room Air  


 


10/25/17 22:35      Nasal Cannula 2.00 


 


10/25/17 22:20     95 Nasal Cannula 2.00 


 


10/25/17 22:20 97.6 85 20 179/86 (117) 95   








Physical Exam


VS: Afebrile


GENERAL: No acute distress, awake, lying in bed


SKIN: Warm and dry.


EYES:  No scleral icterus. No injection or drainage. 


ENT: No nasal bleeding or discharge.  Mucous membranes pink and moist.


CARDIOVASCULAR: Regular rate and rhythm.  no murmurs


RESPIRATORY: No accessory muscle use. Clear to auscultation. Breath sounds 

equal bilaterally. 


GASTROINTESTINAL: Abdomen soft, non-tender, nondistended.  


Extremities: No clubbing, cyanosis, or edema. No obvious deformities. 


MUSCULOSKELETAL: Extremities without clubbing, cyanosis, or edema. No obvious 

deformities. grossly intact ROM with 5/5 strength in upper and lower 

extremities proximally.  Mild chest wall sternal soreness which the patient 

says is not the same as her pain upon presentation.


NEUROLOGICAL: Awake and alert. No obvious cranial nerve deficits.  No facial 

droop nor slurred speech noted.


PSYCHIATRIC: Appropriate mood and affect; insight and judgment normal.


Laboratory





Laboratory Tests








Test


  10/25/17


22:50 10/26/17


01:37 10/26/17


05:15


 


White Blood Count 7.4   


 


Red Blood Count 4.62   


 


Hemoglobin 14.1   


 


Hematocrit 43.0   


 


Mean Corpuscular Volume 93.0   


 


Mean Corpuscular Hemoglobin 30.4   


 


Mean Corpuscular Hemoglobin


Concent 32.7 


  


  


 


 


Red Cell Distribution Width 12.8   


 


Platelet Count 185   


 


Mean Platelet Volume 9.2   


 


Neutrophils (%) (Auto) 64.3   


 


Lymphocytes (%) (Auto) 26.8   


 


Monocytes (%) (Auto) 6.6   


 


Eosinophils (%) (Auto) 1.6   


 


Basophils (%) (Auto) 0.7   


 


Neutrophils # (Auto) 4.7   


 


Lymphocytes # (Auto) 2.0   


 


Monocytes # (Auto) 0.5   


 


Eosinophils # (Auto) 0.1   


 


Basophils # (Auto) 0.1   


 


CBC Comment DIFF FINAL   


 


Differential Comment    


 


Blood Urea Nitrogen 20   


 


Creatinine 1.20   


 


Random Glucose 106   


 


Total Protein 7.4   


 


Albumin 3.8   


 


Calcium Level 8.4   


 


Alkaline Phosphatase 91   


 


Aspartate Amino Transf


(AST/SGOT) 15 


  


  


 


 


Alanine Aminotransferase


(ALT/SGPT) 30 


  


  


 


 


Total Bilirubin 0.3   


 


Sodium Level 141   


 


Potassium Level 3.7   


 


Chloride Level 109   


 


Carbon Dioxide Level 25.0   


 


Anion Gap 7   


 


Estimat Glomerular Filtration


Rate 46 


  


  


 


 


Troponin I LESS THAN 0.02  LESS THAN 0.02  LESS THAN 0.02 








Result Diagram:  


10/25/17 2250                                                                  

              10/25/17 2250





Imaging





Last Impressions








Myocardial Perfusion Scan Nuc Med 10/26/17 0000 Signed





Impressions: 





 Service Date/Time:   10:28 - CONCLUSION:  1. No 

fixed 





 or reversible defects to suggest ischemia or infarction. 2. Normal wall motion 





 and calculated ejection fraction.  RISK CATEGORY:     Low (<1%% Annual 

Mortality 





 Rate)      Harvey Brothers MD 


 


Chest X-Ray 10/25/17 2244 Signed





Impressions: 





 Service Date/Time:  2017 22:53 - CONCLUSION: No acute 





 disease.       Frank Serrano MD 











Caprini VTE Risk Assessment


Caprini VTE Risk Assessment:  Mod/High Risk (score >= 2)


Caprini Risk Assessment Model











 Point Value = 1          Point Value = 2  Point Value = 3  Point Value = 5


 


Age 41-60


Minor surgery


BMI > 25 kg/m2


Swollen legs


Varicose veins


Pregnancy or postpartum


History of unexplained or recurrent


   spontaneous 


Oral contraceptives or hormone


   replacement


Sepsis (< 1 month)


Serious lung disease, including


   pneumonia (< 1 month)


Abnormal pulmonary function


Acute myocardial infarction


Congestive heart failure (< 1 month)


History of inflammatory bowel disease


Medical patient at bed rest Age 61-74


Arthroscopic surgery


Major open surgery (> 45 min)


Laparoscopic surgery (> 45 min)


Malignancy


Confined to bed (> 72 hours)


Immobilizing plaster cast


Central venous access Age >= 75


History of VTE


Family history of VTE


Factor V Leiden


Prothrombin 50457J


Lupus anticoagulant


Anticardiolipin antibodies


Elevated serum homocysteine


Heparin-induced thrombocytopenia


Other congenital or acquired


   thrombophilia Stroke (< 1 month)


Elective arthroplasty


Hip, pelvis, or leg fracture


Acute spinal cord injury (< 1 month)








Prophylaxis Regimen











   Total Risk


Factor Score Risk Level Prophylaxis Regimen


 


0-1      Low Early ambulation


 


2 Moderate Order ONE of the following:


*Sequential Compression Device (SCD)


*Heparin 5000 units SQ BID


 


3-4 Higher Order ONE of the following medications:


*Heparin 5000 units SQ TID


*Enoxaparin/Lovenox 40 mg SQ daily (WT < 150 kg, CrCl > 30 mL/min)


*Enoxaparin/Lovenox 30 mg SQ daily (WT < 150 kg, CrCl > 10-29 mL/min)


*Enoxaparin/Lovenox 30 mg SQ BID (WT < 150 kg, CrCl > 30 mL/min)


AND/OR


*Sequential Compression Device (SCD)


 


5 or more Highest Order ONE of the following medications:


*Heparin 5000 units SQ TID (Preferred with Epidurals)


*Enoxaparin/Lovenox 40 mg SQ daily (WT < 150 kg, CrCl > 30 mL/min)


*Enoxaparin/Lovenox 30 mg SQ daily (WT < 150 kg, CrCl > 10-29 mL/min)


*Enoxaparin/Lovenox 30 mg SQ BID (WT < 150 kg, CrCl > 30 mL/min)


AND


*Sequential Compression Device (SCD)











Assessment and Plan


Assessment and Plan


62-year-old white female admitted for chest pain.  Hemodynamically stable upon 

admission. 





Chest pain


- I independently reviewed the EKGs show sinus rhythm.  We'll proceed with 

Lexiscan protocol initial cardiac workup otherwise, trending troponins.  Unable 

to do treadmill stress test due to fibromyalgia.  Explained to patient that 

most common cause chest pain is GERD and muscle skeletal origin otherwise.





Hypertension


- Continue home lisinopril





GERD


- Continue PPI





Migraine


- Patient complaining of migraine at this moment actively, will give one-time 

dose of Reglan and hold off on her when necessary sumatriptan since she might 

have possible stable angina.  Continue her home topiramate





Genital herpes


- Continue home acyclovir











Discharge disposition


- Lexiscan was negative, patient's clinical status was stable.  Has met maximal 

benefit from hospitalization and is clinically stable for discharge.











Mauricio Cui MD Oct 26, 2017 09:30

## 2017-10-26 NOTE — HHI.HP
__________________________________________________





\[facility]\""


Service


[facility]


Primary Care Physician


GUICHO Han Do, MD


Admission Diagnosis





chest pain


Diagnoses:  


Chief Complaint:  


Chest pain


Travel History


International Travel<30 Days:  No


Contact w/Intl Traveler <30 Da:  No


Traveled to Known Affected Are:  No


History of Present Illness


62-year-old white female being admitted for chest pain.  Patient was in her 

usual state of health until last night when she was sitting watching television 

and experienced a sudden onset of midsternal chest pain that was crushing in 

nature, up to an 8 out of 10, with some mild radiation to her jaw and left 

shoulder.  Had some shortness of breath associated with this, denies any nausea 

vomiting or any diaphoresis otherwise.  Denies taking any medications try to 

treat the pain.  She held a pillow tightly to her chest to see this would help, 

nothing really helped or worsened her pain.  Pain remained at a constant level 

and at that point patient decided to proceed to the ER.





Patient does admit to taking daily acid reflux medications.





Review of Systems


Except as stated in HPI:  all other systems reviewed are Neg





Past Family Social History


Past Medical History


fibromyalgia


HTN


acid reflux


Genital herpes


Migraines


Past Surgical History


rotator cuff repair


carpal tunnel surgery


appendectomy


Allergies:  


Coded Allergies:  


     penicillin G (Unverified  Allergy, Intermediate, HIVES, 8/15/17)


Family History


CAD


Social History


used to work in retail


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


denies cig use, lived w/ parents that smoked as a child





Physical Exam


Vital Signs





Vital Signs








  Date Time  Temp Pulse Resp B/P (MAP) Pulse Ox O2 Delivery O2 Flow Rate FiO2


 


10/26/17 08:00 96.4 72 20 122/63 (82) 93   


 


10/26/17 03:00  73      


 


10/26/17 03:00 96.1 72 20 106/62 (77) 96   


 


10/26/17 02:38  69 16 109/66 (80) 96  2.00 


 


10/26/17 01:40     96 Nasal Cannula 2.00 


 


10/26/17 01:15  78 16 103/58 (73) 95 Nasal Cannula 2.00 


 


10/26/17 00:31  82 20 113/63 (80) 96 Nasal Cannula 2.00 


 


10/25/17 23:05    141/80 (100)    


 


10/25/17 23:00    145/76 (99)    


 


10/25/17 22:40      Room Air  


 


10/25/17 22:35      Nasal Cannula 2.00 


 


10/25/17 22:20     95 Nasal Cannula 2.00 


 


10/25/17 22:20 97.6 85 20 179/86 (117) 95   








Physical Exam


VS: Afebrile


GENERAL: No acute distress, awake, lying in bed


SKIN: Warm and dry.


EYES:  No scleral icterus. No injection or drainage. 


ENT: No nasal bleeding or discharge.  Mucous membranes pink and moist.


CARDIOVASCULAR: Regular rate and rhythm.  no murmurs


RESPIRATORY: No accessory muscle use. Clear to auscultation. Breath sounds 

equal bilaterally. 


GASTROINTESTINAL: Abdomen soft, non-tender, nondistended.  


Extremities: No clubbing, cyanosis, or edema. No obvious deformities. 


MUSCULOSKELETAL: Extremities without clubbing, cyanosis, or edema. No obvious 

deformities. grossly intact ROM with 5/5 strength in upper and lower 

extremities proximally.  Mild chest wall sternal soreness which the patient 

says is not the same as her pain upon presentation.


NEUROLOGICAL: Awake and alert. No obvious cranial nerve deficits.  No facial 

droop nor slurred speech noted.


PSYCHIATRIC: Appropriate mood and affect; insight and judgment normal.


Laboratory





Laboratory Tests








Test


  10/25/17


22:50 10/26/17


01:37 10/26/17


05:15


 


White Blood Count 7.4   


 


Red Blood Count 4.62   


 


Hemoglobin 14.1   


 


Hematocrit 43.0   


 


Mean Corpuscular Volume 93.0   


 


Mean Corpuscular Hemoglobin 30.4   


 


Mean Corpuscular Hemoglobin


Concent 32.7 


  


  


 


 


Red Cell Distribution Width 12.8   


 


Platelet Count 185   


 


Mean Platelet Volume 9.2   


 


Neutrophils (%) (Auto) 64.3   


 


Lymphocytes (%) (Auto) 26.8   


 


Monocytes (%) (Auto) 6.6   


 


Eosinophils (%) (Auto) 1.6   


 


Basophils (%) (Auto) 0.7   


 


Neutrophils # (Auto) 4.7   


 


Lymphocytes # (Auto) 2.0   


 


Monocytes # (Auto) 0.5   


 


Eosinophils # (Auto) 0.1   


 


Basophils # (Auto) 0.1   


 


CBC Comment DIFF FINAL   


 


Differential Comment    


 


Blood Urea Nitrogen 20   


 


Creatinine 1.20   


 


Random Glucose 106   


 


Total Protein 7.4   


 


Albumin 3.8   


 


Calcium Level 8.4   


 


Alkaline Phosphatase 91   


 


Aspartate Amino Transf


(AST/SGOT) 15 


  


  


 


 


Alanine Aminotransferase


(ALT/SGPT) 30 


  


  


 


 


Total Bilirubin 0.3   


 


Sodium Level 141   


 


Potassium Level 3.7   


 


Chloride Level 109   


 


Carbon Dioxide Level 25.0   


 


Anion Gap 7   


 


Estimat Glomerular Filtration


Rate 46 


  


  


 


 


Troponin I LESS THAN 0.02  LESS THAN 0.02  LESS THAN 0.02 








Result Diagram:  


10/25/17 2250                                                                  

              10/25/17 2250





Imaging





Last Impressions








Myocardial Perfusion Scan Nuc Med 10/26/17 0000 Signed





Impressions: 





 Service Date/Time:   10:28 - CONCLUSION:  1. No 

fixed 





 or reversible defects to suggest ischemia or infarction. 2. Normal wall motion 





 and calculated ejection fraction.  RISK CATEGORY:     Low (<1%% Annual 

Mortality 





 Rate)      Harvey Brothers MD 


 


Chest X-Ray 10/25/17 2244 Signed





Impressions: 





 Service Date/Time:  2017 22:53 - CONCLUSION: No acute 





 disease.       Frank Serrano MD 











Caprini VTE Risk Assessment


Caprini VTE Risk Assessment:  Mod/High Risk (score >= 2)


Caprini Risk Assessment Model











 Point Value = 1          Point Value = 2  Point Value = 3  Point Value = 5


 


Age 41-60


Minor surgery


BMI > 25 kg/m2


Swollen legs


Varicose veins


Pregnancy or postpartum


History of unexplained or recurrent


   spontaneous 


Oral contraceptives or hormone


   replacement


Sepsis (< 1 month)


Serious lung disease, including


   pneumonia (< 1 month)


Abnormal pulmonary function


Acute myocardial infarction


Congestive heart failure (< 1 month)


History of inflammatory bowel disease


Medical patient at bed rest Age 61-74


Arthroscopic surgery


Major open surgery (> 45 min)


Laparoscopic surgery (> 45 min)


Malignancy


Confined to bed (> 72 hours)


Immobilizing plaster cast


Central venous access Age >= 75


History of VTE


Family history of VTE


Factor V Leiden


Prothrombin 69551E


Lupus anticoagulant


Anticardiolipin antibodies


Elevated serum homocysteine


Heparin-induced thrombocytopenia


Other congenital or acquired


   thrombophilia Stroke (< 1 month)


Elective arthroplasty


Hip, pelvis, or leg fracture


Acute spinal cord injury (< 1 month)








Prophylaxis Regimen











   Total Risk


Factor Score Risk Level Prophylaxis Regimen


 


0-1      Low Early ambulation


 


2 Moderate Order ONE of the following:


*Sequential Compression Device (SCD)


*Heparin 5000 units SQ BID


 


3-4 Higher Order ONE of the following medications:


*Heparin 5000 units SQ TID


*Enoxaparin/Lovenox 40 mg SQ daily (WT < 150 kg, CrCl > 30 mL/min)


*Enoxaparin/Lovenox 30 mg SQ daily (WT < 150 kg, CrCl > 10-29 mL/min)


*Enoxaparin/Lovenox 30 mg SQ BID (WT < 150 kg, CrCl > 30 mL/min)


AND/OR


*Sequential Compression Device (SCD)


 


5 or more Highest Order ONE of the following medications:


*Heparin 5000 units SQ TID (Preferred with Epidurals)


*Enoxaparin/Lovenox 40 mg SQ daily (WT < 150 kg, CrCl > 30 mL/min)


*Enoxaparin/Lovenox 30 mg SQ daily (WT < 150 kg, CrCl > 10-29 mL/min)


*Enoxaparin/Lovenox 30 mg SQ BID (WT < 150 kg, CrCl > 30 mL/min)


AND


*Sequential Compression Device (SCD)











Assessment and Plan


Assessment and Plan


62-year-old white female admitted for chest pain.  Hemodynamically stable upon 

admission. 





Chest pain


- I independently reviewed the EKGs show sinus rhythm.  We'll proceed with 

Lexiscan protocol initial cardiac workup otherwise, trending troponins.  Unable 

to do treadmill stress test due to fibromyalgia.  Explained to patient that 

most common cause chest pain is GERD and muscle skeletal origin otherwise.





Hypertension


- Continue home lisinopril





GERD


- Continue PPI





Migraine


- Patient complaining of migraine at this moment actively, will give one-time 

dose of Reglan and hold off on her when necessary sumatriptan since she might 

have possible stable angina.  Continue her home topiramate





Genital herpes


- Continue home acyclovir











Discharge disposition


- Lexiscan was negative, patient's clinical status was stable.  Has met maximal 

benefit from hospitalization and is clinically stable for discharge.











Mauricio Cui MD Oct 26, 2017 09:30

## 2017-10-26 NOTE — HHI.DCPOC
Discharge Care Plan


Diagnosis:  


(1) Chest pain








Your Health Problems Are: Chest Pain








Goals to Promote Your Health


* To prevent worsening of your condition and complications


* To maintain your health at the optimal level


Directions to Meet Your Goals


*** Take your medications as prescribed


*** Follow your dietary instruction


*** Follow activity as directed








*** Keep your appointments as scheduled


*** Take your immunizations and boosters as scheduled


*** If your symptoms worsen call your PCP, if no PCP go to Urgent Care Center 

or Emergency Room***


*** Smoking is Dangerous to Your Health. Avoid second hand smoke***


***Call the 24-hour hour crisis hotline for domestic abuse at 1-750.735.6343***











Joao Whitman Oct 26, 2017 12:54

## 2017-10-26 NOTE — RADRPT
EXAM DATE/TIME:  10/26/2017 10:28 

 

[facility] COMPARISON:     

No previous studies available for comparison.

 

 

INDICATIONS :     

Left sided chest pain with shortness of breath for one day. Angina. 

                           

 

DOSE:     

25.4 mCi Tc99m Myoview at stress.

                     8.5 mCi Tc99m Myoview at rest.

                     0.4 mg Lexiscan

                       

 

 

STRESS SYMPTOMS:      

Short of breath and flush.

                       

 

 

EJECTION FRACTION:       

60%

                       

 

MEDICAL HISTORY :     

Hypertension. Hepatitis C.  

 

SURGICAL HISTORY :      

Hysterectomy. Appendectomy.  

 

ENCOUNTER:     

Initial

 

ACUITY:     

1 day

 

PAIN SCALE:     

5/10

 

LOCATION:      

Left chest 

 

TECHNIQUE:     

The patient underwent pharmacologic stress with infusion of prescribed dose.  Continuous ECG tracing 
was monitored during stress.  Gated SPECT imaging was performed after stress and conventional SPECT i
maging was performed at rest.  The examination was performed on a SPECT/CT scanner, both attenuation 
and non-corrected datasets were reviewed.

 

FINDINGS:     

 

DISTRIBUTION:     

The maximum perfused segment at stress is in the lateral wall. There is a summed stress score 0.

 

PERFUSION STUDY:     

The pattern of perfusion at stress is within normal limits.

 

GATED STUDY:     

There is intact wall motion and thickening without hypokinetic or dyskinetic segments. 

 

CONCLUSION:     

1. No fixed or reversible defects to suggest ischemia or infarction.

2. Normal wall motion and calculated ejection fraction.

 

RISK CATEGORY:     Low (<1% Annual Mortality Rate)

 

 

 

 

 Harvey Brothers MD on October 26, 2017 at 12:09           

Board Certified Radiologist.

 This report was verified electronically.

## 2017-10-26 NOTE — HHI.DCPOC
Discharge Care Plan


Diagnosis:  


(1) Chest pain








Your Health Problems Are: Chest Pain








Goals to Promote Your Health


* To prevent worsening of your condition and complications


* To maintain your health at the optimal level


Directions to Meet Your Goals


*** Take your medications as prescribed


*** Follow your dietary instruction


*** Follow activity as directed








*** Keep your appointments as scheduled


*** Take your immunizations and boosters as scheduled


*** If your symptoms worsen call your PCP, if no PCP go to Urgent Care Center 

or Emergency Room***


*** Smoking is Dangerous to Your Health. Avoid second hand smoke***


***Call the 24-hour hour crisis hotline for domestic abuse at [phone]***











Joao Whitman Oct 26, 2017 12:54

## 2017-10-28 NOTE — TR
Date Performed: 10/26/2017       Time Performed: 10:52:02

 

DOCTOR:      John Leyva 

 

DRUG LIST:     

CLINICAL HISTORY:      CHEST PAIN

REASON FOR TEST:      Chest pain

REASON FOR ENDING:     

OBSERVATION:     

CONCLUSION:      Lexiscan stress test was performed under standard four minute protocol. Radionuclide
 was injected one minute prior to ending the test. No electrocardiographic abormalities were present 
to suggest ischemia. Nuclear imaging and interpretation are pending.

COMMENTS:

## 2018-06-01 ENCOUNTER — HOSPITAL ENCOUNTER (EMERGENCY)
Dept: HOSPITAL 17 - PHED | Age: 63
Discharge: HOME | End: 2018-06-01
Payer: COMMERCIAL

## 2018-06-01 VITALS
RESPIRATION RATE: 18 BRPM | HEART RATE: 78 BPM | OXYGEN SATURATION: 96 % | SYSTOLIC BLOOD PRESSURE: 96 MMHG | DIASTOLIC BLOOD PRESSURE: 53 MMHG

## 2018-06-01 VITALS — RESPIRATION RATE: 16 BRPM | OXYGEN SATURATION: 97 %

## 2018-06-01 VITALS
TEMPERATURE: 98 F | OXYGEN SATURATION: 96 % | HEART RATE: 89 BPM | RESPIRATION RATE: 16 BRPM | SYSTOLIC BLOOD PRESSURE: 148 MMHG | DIASTOLIC BLOOD PRESSURE: 68 MMHG

## 2018-06-01 VITALS — HEIGHT: 63 IN | WEIGHT: 185.85 LBS | BODY MASS INDEX: 32.93 KG/M2

## 2018-06-01 DIAGNOSIS — B95.61: ICD-10-CM

## 2018-06-01 DIAGNOSIS — B19.20: ICD-10-CM

## 2018-06-01 DIAGNOSIS — F32.9: ICD-10-CM

## 2018-06-01 DIAGNOSIS — N39.0: Primary | ICD-10-CM

## 2018-06-01 DIAGNOSIS — M79.7: ICD-10-CM

## 2018-06-01 DIAGNOSIS — I10: ICD-10-CM

## 2018-06-01 DIAGNOSIS — F41.9: ICD-10-CM

## 2018-06-01 DIAGNOSIS — K21.9: ICD-10-CM

## 2018-06-01 DIAGNOSIS — Z87.442: ICD-10-CM

## 2018-06-01 LAB
ALBUMIN SERPL-MCNC: 4 GM/DL (ref 3.4–5)
ALP SERPL-CCNC: 100 U/L (ref 45–117)
ALT SERPL-CCNC: 31 U/L (ref 10–53)
AST SERPL-CCNC: 16 U/L (ref 15–37)
BACTERIA #/AREA URNS HPF: (no result) /HPF
BASOPHILS # BLD AUTO: 0.1 TH/MM3 (ref 0–0.2)
BASOPHILS NFR BLD: 1.1 % (ref 0–2)
BILIRUB SERPL-MCNC: 0.4 MG/DL (ref 0.2–1)
BUN SERPL-MCNC: 24 MG/DL (ref 7–18)
CALCIUM SERPL-MCNC: 8.9 MG/DL (ref 8.5–10.1)
CHLORIDE SERPL-SCNC: 108 MEQ/L (ref 98–107)
COLOR UR: YELLOW
CREAT SERPL-MCNC: 1.44 MG/DL (ref 0.5–1)
EOSINOPHIL # BLD: 0.2 TH/MM3 (ref 0–0.4)
EOSINOPHIL NFR BLD: 1.6 % (ref 0–4)
ERYTHROCYTE [DISTWIDTH] IN BLOOD BY AUTOMATED COUNT: 12.8 % (ref 11.6–17.2)
GFR SERPLBLD BASED ON 1.73 SQ M-ARVRAT: 37 ML/MIN (ref 89–?)
GLUCOSE SERPL-MCNC: 92 MG/DL (ref 74–106)
GLUCOSE UR STRIP-MCNC: (no result) MG/DL
HCO3 BLD-SCNC: 23 MEQ/L (ref 21–32)
HCT VFR BLD CALC: 46.7 % (ref 35–46)
HGB BLD-MCNC: 15 GM/DL (ref 11.6–15.3)
HGB UR QL STRIP: (no result)
KETONES UR STRIP-MCNC: (no result) MG/DL
LYMPHOCYTES # BLD AUTO: 2.2 TH/MM3 (ref 1–4.8)
LYMPHOCYTES NFR BLD AUTO: 22.1 % (ref 9–44)
MCH RBC QN AUTO: 30.4 PG (ref 27–34)
MCHC RBC AUTO-ENTMCNC: 32.2 % (ref 32–36)
MCV RBC AUTO: 94.5 FL (ref 80–100)
MONOCYTE #: 0.6 TH/MM3 (ref 0–0.9)
MONOCYTES NFR BLD: 6 % (ref 0–8)
MUCOUS THREADS #/AREA URNS LPF: (no result) /LPF
NEUTROPHILS # BLD AUTO: 6.9 TH/MM3 (ref 1.8–7.7)
NEUTROPHILS NFR BLD AUTO: 69.2 % (ref 16–70)
NITRITE UR QL STRIP: (no result)
PLATELET # BLD: 217 TH/MM3 (ref 150–450)
PMV BLD AUTO: 8.5 FL (ref 7–11)
PROT SERPL-MCNC: 7.7 GM/DL (ref 6.4–8.2)
RBC # BLD AUTO: 4.94 MIL/MM3 (ref 4–5.3)
RBC #/AREA URNS HPF: (no result) /HPF (ref 0–3)
SODIUM SERPL-SCNC: 140 MEQ/L (ref 136–145)
SP GR UR STRIP: 1.02 (ref 1–1.03)
SQUAMOUS #/AREA URNS HPF: (no result) /HPF (ref 0–5)
URINE LEUKOCYTE ESTERASE: (no result)
WBC # BLD AUTO: 10 TH/MM3 (ref 4–11)
WHITE BLOOD CELL CLUMPS: (no result)

## 2018-06-01 PROCEDURE — 85025 COMPLETE CBC W/AUTO DIFF WBC: CPT

## 2018-06-01 PROCEDURE — 99284 EMERGENCY DEPT VISIT MOD MDM: CPT

## 2018-06-01 PROCEDURE — 80053 COMPREHEN METABOLIC PANEL: CPT

## 2018-06-01 PROCEDURE — 81001 URINALYSIS AUTO W/SCOPE: CPT

## 2018-06-01 PROCEDURE — 96361 HYDRATE IV INFUSION ADD-ON: CPT

## 2018-06-01 PROCEDURE — 83690 ASSAY OF LIPASE: CPT

## 2018-06-01 PROCEDURE — 96374 THER/PROPH/DIAG INJ IV PUSH: CPT

## 2018-06-01 PROCEDURE — 74176 CT ABD & PELVIS W/O CONTRAST: CPT

## 2018-06-01 PROCEDURE — 87086 URINE CULTURE/COLONY COUNT: CPT

## 2018-06-01 PROCEDURE — 87186 SC STD MICRODIL/AGAR DIL: CPT

## 2018-06-01 PROCEDURE — 86403 PARTICLE AGGLUT ANTBDY SCRN: CPT

## 2018-06-01 NOTE — RADRPT
EXAM DATE:  6/1/2018 9:24 PM EDT

AGE/SEX:        63 years / Female



INDICATIONS:  Lower abdomen pain and back pain for two days



CLINICAL DATA:  This is the patient's initial encounter. Patient reports that signs and symptoms have
 been present for 2 days and indicates a pain score of 6/10. 

                                                                          

MEDICAL/SURGICAL HISTORY:       Hypertension.  Diverticulitis.  Renal disease.  Hep c  Appendectomy. 
 Hysterectomy.



RADIATION DOSE:  16.70 CTDI (mGy)









COMPARISON:      [facility], CT ABDOMEN & PELVIS W/O CONTRAST, 12/4/2016.  .



TECHNIQUE:  Multiple contiguous axial images were obtained through the abdomen. Images were obtained 
using multiple row detector helical technique. Using dose reduction techniques, radiation dose was ke
pt as low as reasonably achievable to obtain optimal diagnostic quality images. 



FINDINGS: 

Lower chest: No acute abnormality is identified.

Hepatobiliary: Liver density is normal. There are multiple low-density lesions measuring up to 2.2 cm
. These are stable and the larger lesions have features consistent with cysts while some of the small
er lesions are too small to characterize. There are calcified stones in the gallbladder. No wall thic
kening is present.

Kidneys: No hydronephrosis, stone, or mass. Left kidney remains atrophic and there is a mildly hyperd
ense stable lesion at the left upper pole kidney measuring 2.3 cm.

Adrenal Glands: Within normal limits.

Spleen: Within normal limits.

Pancreas: Within normal limits.

Vascular: The aorta is nonaneurysmal. There is mild atherosclerotic disease.

Bowel/Mesentery: The stomach and small bowel demonstrate no abnormality. No acute colon abnormality i
s seen. There is no free intraperitoneal air or fluid. There is sigmoid diverticulosis and mild scatt
ered diverticula around the remainder of the colon.

Abdominal Wall: No hernia is visualized.

Retroperitoneum: No lymphadenopathy.

Bladder: No wall thickening or mass. 

Reproductive: Uterus is absent. No adnexal abnormality is seen.

Inguinal:  No lymphadenopathy or hernia. 

Musculoskeletal: No acute osseous abnormality is identified. There are degenerative changes of the josephine
mbar spine.





CONCLUSION:

1.  No acute finding is identified to explain the clinical symptoms.

2.  Stable mildly atrophic left kidney containing a mildly hyperdense left upper pole lesion measurin
g 2.3 cm.

3.  Stable nonacute findings include cholelithiasis, hepatic cysts, and sigmoid diverticulosis.



Electronically signed by: Frank Liang MD  6/1/2018 9:31 PM EDT

## 2018-06-01 NOTE — PD
HPI


Chief Complaint:  Abdominal Pain


Time Seen by Provider:  19:34


Travel History


International Travel<30 days:  No


Contact w/Intl Traveler<30days:  No


Traveled to known affect area:  No





History of Present Illness


HPI


Patient 63-year-old female with a history of kidney stones never requiring 

operative intervention presents emergency department for evaluation of 

suprapubic abdominal discomfort radiating to her back over the past few days, 

the patient states initially started just with suprapubic pain and then 

progressed to having back pain as well.  She states it feels somewhat different 

than her normal kidney stone pain but is been for some time since she has had a 

kidney stone.  No fevers no diarrhea no constipation mild nausea without 

vomiting.  No chest pain or shortness of breath no headache.  States symptoms 

are moderate, gradually worsening over the past few days, radiation and context 

as above per





No vaginal bleeding vaginal discharge fevers diarrhea constipation vomiting 

blood in stool peer





PFSH


Past Medical History


Hx Anticoagulant Therapy:  No


Anxiety:  Yes


Depression:  Yes


Cancer:  No


Cardiovascular Problems:  Yes (htn)


Chemotherapy:  No


Cerebrovascular Accident:  No


Diabetes:  No


Diminished Hearing:  No


Endocrine:  No


Fibromyalgia:  Yes


Gastrointestinal Disorders:  Yes (ACID REFLUX, DIVERTICULITIS, INFLAMED COLON)


GERD:  Yes


Genitourinary:  Yes (KIDNEY INFECTION, "STAGE 3 KIDNEY PROBLEM")


Hepatitis:  Yes (HEP C FROM BLOOD TRANSFUSION-AT 12)


Hiatal Hernia:  No


Hypertension:  Yes


Immune Disorder:  No


Implanted Vascular Access Dvce:  No


Musculoskeletal:  Yes (BACK PAIN)


Neurologic:  Yes (MIGRAINES, [id number])


Psychiatric:  No


Reproductive:  No


Respiratory:  No


Migraines:  Yes


Radiation Therapy:  No


Thyroid Disease:  No


Tetanus Vaccination:  > 5 Years


Influenza Vaccination:  Yes


:  4


Para:  2


Miscarriage:  2


Tubal Ligation:  Yes





Past Surgical History


Abdominal Surgery:  Yes (appy)


AICD:  No


Appendectomy:  Yes


Gynecologic Surgery:  Yes (total hysterectomy)


Hysterectomy:  Yes


Joint Replacement:  No


Pacemaker:  No


Other Surgery:  Yes





Social History


Alcohol Use:  Yes (social)


Tobacco Use:  No


Substance Use:  No





Allergies-Medications


(Allergen,Severity, Reaction):  


Coded Allergies:  


     penicillin G (Verified  Allergy, Intermediate, HIVES, 18)


Reported Meds & Prescriptions





Reported Meds & Active Scripts


Active


Bentyl (Dicyclomine HCl) 10 Mg Cap 10 Mg PO TID PRN


Keflex (Cephalexin) 500 Mg Cap 500 Mg PO Q6H 7 Days


Reported


Rosuvastatin (Rosuvastatin Calcium) 5 Mg Tab 5 Mg PO DAILY


Acyclovir 800 Mg Tab 800 Mg PO BID


Omeprazole 20 Mg Cap   


Imitrex (Sumatriptan Succinate) 100 Mg Tab 100 Mg PO ONCE PRN


     If a satisfactory response has not been obtained at 2 hours, a second


     dose may be administered


Topiramate 50 Mg Tab 50 Mg PO BID


Lisinopril 10 Mg Tab 10 Mg PO DAILY








Review of Systems


Except as stated in HPI:  all other systems reviewed are Neg





Physical Exam


Narrative


GENERAL: Well-developed well-nourished no obvious distress


SKIN: Focused skin assessment warm/dry.


HEAD: Atraumatic. Normocephalic. 


EYES: Pupils equal and round. No scleral icterus. No injection or drainage. 


ENT: No nasal bleeding or discharge.  Mucous membranes pink and moist.


NECK: Trachea midline. No JVD. 


CARDIOVASCULAR: Regular rate and rhythm.  No murmur appreciated.


RESPIRATORY: No accessory muscle use. Clear to auscultation. Breath sounds 

equal bilaterally. 


GASTROINTESTINAL: Abdomen soft, non-tender, nondistended. Hepatic and splenic 

margins not palpable.  No rebound no percussive tenderness, no CVA tenderness.  

Really the abdomen is benign peer


MUSCULOSKELETAL: No obvious deformities. No clubbing.  No cyanosis.  No edema. 


NEUROLOGICAL: Awake and alert. No obvious cranial nerve deficits.  Motor 

grossly within normal limits. Normal speech.


PSYCHIATRIC: Appropriate mood and affect; insight and judgment normal.





Data


Data


Last Documented VS





Vital Signs








  Date Time  Temp Pulse Resp B/P (MAP) Pulse Ox O2 Delivery O2 Flow Rate FiO2


 


18 22:01        


 


18 21:30  78 18  96 Room Air  


 


18 19:23 98.0       








Orders





 Orders


Complete Blood Count With Diff (18 19:52)


Comprehensive Metabolic Panel (18 19:52)


Lipase (18 19:52)


Urinalysis - C+S If Indicated (18 19:52)


Iv Access Insert/Monitor (18 19:52)


Ecg Monitoring (18 19:52)


Oximetry (18 19:52)


Sodium Chlor 0.9% 1000 Ml Inj (Ns 1000 M (18 19:52)


Sodium Chloride 0.9% Flush (Ns Flush) (18 20:00)


Ketorolac Inj (Toradol Inj) (18 20:00)


Ondansetron  Odt (Zofran  Odt) (18 20:00)


Urine Culture (18 20:08)


Ct Abd/Pel W/O Iv Contrast (18 )


Ed Discharge Order (18 21:54)


Dicyclomine (Bentyl) (18 22:15)





Labs





Laboratory Tests








Test


  18


20:08


 


White Blood Count 10.0 TH/MM3 


 


Red Blood Count 4.94 MIL/MM3 


 


Hemoglobin 15.0 GM/DL 


 


Hematocrit 46.7 % 


 


Mean Corpuscular Volume 94.5 FL 


 


Mean Corpuscular Hemoglobin 30.4 PG 


 


Mean Corpuscular Hemoglobin


Concent 32.2 % 


 


 


Red Cell Distribution Width 12.8 % 


 


Platelet Count 217 TH/MM3 


 


Mean Platelet Volume 8.5 FL 


 


Neutrophils (%) (Auto) 69.2 % 


 


Lymphocytes (%) (Auto) 22.1 % 


 


Monocytes (%) (Auto) 6.0 % 


 


Eosinophils (%) (Auto) 1.6 % 


 


Basophils (%) (Auto) 1.1 % 


 


Neutrophils # (Auto) 6.9 TH/MM3 


 


Lymphocytes # (Auto) 2.2 TH/MM3 


 


Monocytes # (Auto) 0.6 TH/MM3 


 


Eosinophils # (Auto) 0.2 TH/MM3 


 


Basophils # (Auto) 0.1 TH/MM3 


 


CBC Comment DIFF FINAL 


 


Differential Comment  


 


Urine Color YELLOW 


 


Urine Turbidity CLEAR 


 


Urine pH 5.5 


 


Urine Specific Gravity 1.020 


 


Urine Protein NEG mg/dL 


 


Urine Glucose (UA) NEG mg/dL 


 


Urine Ketones NEG mg/dL 


 


Urine Occult Blood NEG 


 


Urine Nitrite NEG 


 


Urine Bilirubin NEG 


 


Urine Urobilinogen 0.2 MG/DL 


 


Urine Leukocyte Esterase MOD 


 


Urine RBC 0-3 /hpf 


 


Urine WBC 9-14 /hpf 


 


Urine WBC Clumps FEW 


 


Urine Squamous Epithelial


Cells 0-5 /hpf 


 


 


Urine Bacteria FEW /hpf 


 


Urine Mucus FEW /lpf 


 


Microscopic Urinalysis Comment


  CULTURE


INDICATED


 


Blood Urea Nitrogen 24 MG/DL 


 


Creatinine 1.44 MG/DL 


 


Random Glucose 92 MG/DL 


 


Total Protein 7.7 GM/DL 


 


Albumin 4.0 GM/DL 


 


Calcium Level 8.9 MG/DL 


 


Alkaline Phosphatase 100 U/L 


 


Aspartate Amino Transf


(AST/SGOT) 16 U/L 


 


 


Alanine Aminotransferase


(ALT/SGPT) 31 U/L 


 


 


Total Bilirubin 0.4 MG/DL 


 


Sodium Level 140 MEQ/L 


 


Potassium Level 3.6 MEQ/L 


 


Chloride Level 108 MEQ/L 


 


Carbon Dioxide Level 23.0 MEQ/L 


 


Anion Gap 9 MEQ/L 


 


Estimat Glomerular Filtration


Rate 37 ML/MIN 


 


 


Lipase 350 U/L 











MDM


Medical Decision Making


Medical Screen Exam Complete:  Yes


Emergency Medical Condition:  Yes


Differential Diagnosis


Kidney stone, UTI, acute abdomen unlikely, diverticulitis, colitis peer


Narrative Course


Patient room to the emergency department, she appears well and her abdomen is 

benign.  She does have a bandlike distribution in her low abdomen, could be 

consistent with urinary tract infection which she has tested positive for here, 

CT abdomen does not show any kidney stones.  Unfortunately our chemistry 

analyzer has gone down while the patient is in the emergency department with no 

telling when it will return functioning.  I do have a sodium potassium and 

chloride on the patient which are reassuring.  The patient is feeling somewhat 

better, and I discussed that while I do not have her creatinine or liver 

function tests back yet the remainder of her labs including a CBC and basic 

electrolytes are reassuring and I think that it is reasonable for her to 

consider discharge with antibiotic therapy at this time and she is agreeable.  

Discussed follow-up with her primary care physician and return to ED criteria.





Later in the evening at the time of this dictation I reviewed her labs and her 

creatinine is baseline her LFTs and the remainder of the chemistry are 

reassuring.  There is no indication for any other interventions based on these 

labs.





Diagnosis





 Primary Impression:  


 UTI (urinary tract infection)


***Med/Other Pt SpecificInfo:  Prescription(s) given


Scripts


Dicyclomine (Bentyl) 10 Mg Cap


10 MG PO TID Y for ABDOMINAL CRAMPING, #20 CAP 0 Refills


   Prov: Juan Antonio Ham MD         18 


Cephalexin (Keflex) 500 Mg Cap


500 MG PO Q6H for Infection for 7 Days, #28 CAP 0 Refills


   Prov: Juan Antonio Ham MD         18


Disposition:  01 DISCHARGE HOME


Condition:  Stable











Juan Antonio Ham MD 2018 19:56